# Patient Record
Sex: FEMALE | Race: WHITE
[De-identification: names, ages, dates, MRNs, and addresses within clinical notes are randomized per-mention and may not be internally consistent; named-entity substitution may affect disease eponyms.]

---

## 2017-12-11 ENCOUNTER — RESULT REVIEW (OUTPATIENT)
Age: 57
End: 2017-12-11

## 2019-05-02 ENCOUNTER — RESULT REVIEW (OUTPATIENT)
Age: 59
End: 2019-05-02

## 2019-12-02 PROBLEM — Z00.00 ENCOUNTER FOR PREVENTIVE HEALTH EXAMINATION: Status: ACTIVE | Noted: 2019-12-02

## 2020-02-03 ENCOUNTER — APPOINTMENT (OUTPATIENT)
Dept: INFECTIOUS DISEASE | Facility: CLINIC | Age: 60
End: 2020-02-03
Payer: COMMERCIAL

## 2020-02-03 VITALS
WEIGHT: 135 LBS | HEIGHT: 65 IN | OXYGEN SATURATION: 100 % | TEMPERATURE: 97.3 F | HEART RATE: 73 BPM | RESPIRATION RATE: 14 BRPM | DIASTOLIC BLOOD PRESSURE: 81 MMHG | BODY MASS INDEX: 22.49 KG/M2 | SYSTOLIC BLOOD PRESSURE: 146 MMHG

## 2020-02-03 DIAGNOSIS — K58.9 IRRITABLE BOWEL SYNDROME W/OUT DIARRHEA: ICD-10-CM

## 2020-02-03 DIAGNOSIS — Z87.440 PERSONAL HISTORY OF URINARY (TRACT) INFECTIONS: ICD-10-CM

## 2020-02-03 DIAGNOSIS — Z86.19 PERSONAL HISTORY OF OTHER INFECTIOUS AND PARASITIC DISEASES: ICD-10-CM

## 2020-02-03 DIAGNOSIS — Z82.49 FAMILY HISTORY OF ISCHEMIC HEART DISEASE AND OTHER DISEASES OF THE CIRCULATORY SYSTEM: ICD-10-CM

## 2020-02-03 PROCEDURE — 99205 OFFICE O/P NEW HI 60 MIN: CPT

## 2020-02-03 RX ORDER — BIFIDOBACTERIUM LONGUM 10MM CELL
CAPSULE ORAL
Refills: 0 | Status: ACTIVE | COMMUNITY

## 2020-02-03 RX ORDER — MULTIVITAMIN
TABLET ORAL
Refills: 0 | Status: ACTIVE | COMMUNITY

## 2020-02-03 RX ORDER — LACTOBACILLUS RHAMNOSUS GG 10B CELL
CAPSULE ORAL
Refills: 0 | Status: ACTIVE | COMMUNITY

## 2020-02-03 NOTE — ASSESSMENT
[Treatment Education] : treatment education [Risk Reduction] : risk reduction [Nutritional / Food Issues] : nutritional/food issues [Medical Care Issues] : medical care issues [FreeTextEntry1] : Ms Lalo is concerned by her predisposition to Cdiff.\par \par The nature of Clostridium difficile infection and its relationship to the normal gut microbiome- which is diminished by antibiotic exposure- was discussed at some length. Her previous antibiotic exposure and history of treated UTIs suggest an underlying dysbiosis. \par \par Different strategies to augment the normal gut microbiome was reviewed including minimizing antimicrobial exposure when feasible.  Minimization of antibiotics, and use of pathogen directed narrow spectrum antibiotics was discussed. It is suggested that urine cultures be done to facilitate antibiotic prescribing and the shortest duration of antibiotics be ultilized. She expressed willingness to have Influenza and Pneumococcal vaccinations when she sees her primary care physician.\par \par The gradual introduction of fiber containing foods beginning with very small portions with progressive increases every 4-5 days was suggested utilizing "prebiotic" beans and legumes \par \par The use of probiotics was advised -  specifically Kefir cultured milk drink which has been associated with improved outcomes from recurrent C. difficile. (Reference: Shawn HDEZ. Clinical Infectious Diseases 2014; 59; 858-61.)\par \par She asked about Fecal transplant The FDA advisory from 19 was not reviewed. The FDA announced that two immunocompromised adults who received investigational FMT developed invasive infections caused by extended-spectrum beta-lactamase (ESBL)-producing Escherichia coli (E.coli). One of the individuals . On 19 the FDA has mandated additional testing for MDROs including extended spectrum beta-lactamase (ESBL)-producing Enterobacteriaceae, vancomycin-resistant enterococci (VRE), carbapenem-resistant Enterobacteriaceae (CRE), and methicillin-resistant Staphylococcus aureus (MRSA). Our screening procedures are not adequate and unfortunately I am no longer able to offer FMT but would be able to refer appropriately if needed. \par \par Patient will follow up as needed.\par

## 2020-02-03 NOTE — PHYSICAL EXAM
[General Appearance - Alert] : alert [General Appearance - In No Acute Distress] : in no acute distress [Sclera] : the sclera and conjunctiva were normal [PERRL With Normal Accommodation] : pupils were equal in size, round, reactive to light [Extraocular Movements] : extraocular movements were intact [Outer Ear] : the ears and nose were normal in appearance [Oropharynx] : the oropharynx was normal with no thrush [Neck Appearance] : the appearance of the neck was normal [Neck Cervical Mass (___cm)] : no neck mass was observed [Jugular Venous Distention Increased] : there was no jugular-venous distention [Thyroid Diffuse Enlargement] : the thyroid was not enlarged [Auscultation Breath Sounds / Voice Sounds] : lungs were clear to auscultation bilaterally [Heart Rate And Rhythm] : heart rate was normal and rhythm regular [Heart Sounds] : normal S1 and S2 [Heart Sounds Gallop] : no gallops [Murmurs] : no murmurs [Heart Sounds Pericardial Friction Rub] : no pericardial rub [Edema] : there was no peripheral edema [No Palpable Adenopathy] : no palpable adenopathy [Musculoskeletal - Swelling] : no joint swelling [Nail Clubbing] : no clubbing  or cyanosis of the fingernails [Motor Tone] : muscle strength and tone were normal [Skin Color & Pigmentation] : normal skin color and pigmentation [] : no rash [No Focal Deficits] : no focal deficits [Oriented To Time, Place, And Person] : oriented to person, place, and time [Affect] : the affect was normal

## 2020-02-03 NOTE — CONSULT LETTER
[Dear  ___] : Dear  [unfilled], [Consult Letter:] : I had the pleasure of evaluating your patient, [unfilled]. [Please see my note below.] : Please see my note below. [Consult Closing:] : Thank you very much for allowing me to participate in the care of this patient.  If you have any questions, please do not hesitate to contact me. [Sincerely,] : Sincerely, [FreeTextEntry2] : Dr Neelima Horton\par [facility]\par [address]\[place] [address] [FreeTextEntry3] : Chetan Cast MD, FACP, PETER, AAHIVM\par Infectious Diseases\par [facility]

## 2020-02-03 NOTE — HISTORY OF PRESENT ILLNESS
[FreeTextEntry1] : Ms Brink seeks consulation to explain her predisposition to Cdiff.\par \par She has required frequent antibiotics for UTIs.\par In 2011, she was treated with prolonged course of Cipro/flagyl for a diarrheal illness. The antibiotics exacerbated her symptoms and she was hospitalized at [facility] 3/11 with severe Cdiff. She has had IBS symptoms ever since.\par She had mild case of Cdiff n 6/18 -passing only 3-4 loose stools daily which responded to po vanco.\par \par She tested +Cdiff 9/19/19. She described have Cdiff in 11/19 and took 2 courses of po vanco with presistent symptoms. She took DIFICID early January and has been well since that time.\par \par

## 2020-06-13 ENCOUNTER — RESULT REVIEW (OUTPATIENT)
Age: 60
End: 2020-06-13

## 2020-06-28 ENCOUNTER — RESULT REVIEW (OUTPATIENT)
Age: 60
End: 2020-06-28

## 2021-08-12 ENCOUNTER — APPOINTMENT (OUTPATIENT)
Dept: OTOLARYNGOLOGY | Facility: CLINIC | Age: 61
End: 2021-08-12